# Patient Record
Sex: MALE | Race: ASIAN | NOT HISPANIC OR LATINO | URBAN - METROPOLITAN AREA
[De-identification: names, ages, dates, MRNs, and addresses within clinical notes are randomized per-mention and may not be internally consistent; named-entity substitution may affect disease eponyms.]

---

## 2024-09-23 ENCOUNTER — APPOINTMENT (RX ONLY)
Dept: URBAN - METROPOLITAN AREA CLINIC 159 | Facility: CLINIC | Age: 34
Setting detail: DERMATOLOGY
End: 2024-09-23

## 2024-09-23 DIAGNOSIS — L40.0 PSORIASIS VULGARIS: ICD-10-CM | Status: INADEQUATELY CONTROLLED

## 2024-09-23 PROCEDURE — ? PRESCRIPTION SAMPLES PROVIDED

## 2024-09-23 PROCEDURE — ? DIAGNOSIS COMMENT

## 2024-09-23 PROCEDURE — ? COUNSELING

## 2024-09-23 PROCEDURE — ? ADDITIONAL NOTES

## 2024-09-23 PROCEDURE — 99204 OFFICE O/P NEW MOD 45 MIN: CPT

## 2024-09-23 PROCEDURE — ? PRESCRIPTION

## 2024-09-23 PROCEDURE — ? ORDER TESTS

## 2024-09-23 PROCEDURE — ? SOTYKTU INITIATION

## 2024-09-23 PROCEDURE — ? LAB REPORTS REVIEWED

## 2024-09-23 RX ORDER — TAPINAROF 10 MG/1000MG
CREAM TOPICAL
Qty: 60 | Refills: 10 | Status: ERX | COMMUNITY
Start: 2024-09-23

## 2024-09-23 RX ORDER — DEUCRAVACITINIB 6 MG/1
TABLET, FILM COATED ORAL
Qty: 30 | Refills: 2 | Status: ERX | COMMUNITY
Start: 2024-09-23

## 2024-09-23 RX ADMIN — TAPINAROF: 10 CREAM TOPICAL at 00:00

## 2024-09-23 RX ADMIN — DEUCRAVACITINIB: 6 TABLET, FILM COATED ORAL at 00:00

## 2024-09-23 ASSESSMENT — LOCATION SIMPLE DESCRIPTION DERM
LOCATION SIMPLE: LEFT EAR
LOCATION SIMPLE: LEFT KNEE
LOCATION SIMPLE: RIGHT ELBOW
LOCATION SIMPLE: RIGHT SMALL FINGER
LOCATION SIMPLE: SCROTUM
LOCATION SIMPLE: RIGHT KNEE
LOCATION SIMPLE: LEFT SMALL FINGER
LOCATION SIMPLE: RIGHT INDEX FINGER
LOCATION SIMPLE: RIGHT MIDDLE FINGER
LOCATION SIMPLE: LEFT THUMB
LOCATION SIMPLE: LEFT MIDDLE FINGER
LOCATION SIMPLE: RIGHT THUMB
LOCATION SIMPLE: RIGHT RING FINGER
LOCATION SIMPLE: RIGHT EAR
LOCATION SIMPLE: LEFT INDEX FINGER
LOCATION SIMPLE: LEFT RING FINGER
LOCATION SIMPLE: LEFT ELBOW

## 2024-09-23 ASSESSMENT — LOCATION DETAILED DESCRIPTION DERM
LOCATION DETAILED: RIGHT DISTAL DORSAL MIDDLE FINGER
LOCATION DETAILED: LEFT SCROTUM
LOCATION DETAILED: LEFT DISTAL DORSAL MIDDLE FINGER
LOCATION DETAILED: RIGHT INFERIOR POSTERIOR HELIX
LOCATION DETAILED: RIGHT ELBOW
LOCATION DETAILED: LEFT KNEE
LOCATION DETAILED: RIGHT DISTAL ULNAR THUMB
LOCATION DETAILED: RIGHT DISTAL ULNAR DORSAL SMALL FINGER
LOCATION DETAILED: LEFT DISTAL ULNAR THUMB
LOCATION DETAILED: RIGHT DISTAL DORSAL RING FINGER
LOCATION DETAILED: LEFT DISTAL DORSAL RING FINGER
LOCATION DETAILED: LEFT POSTAURICULAR CREASE
LOCATION DETAILED: RIGHT INDEX DISTAL INTERPHALANGEAL JOINT
LOCATION DETAILED: RIGHT KNEE
LOCATION DETAILED: LEFT DISTAL DORSAL INDEX FINGER
LOCATION DETAILED: LEFT ELBOW
LOCATION DETAILED: PERIUNGUAL SKIN LEFT SMALL FINGER

## 2024-09-23 ASSESSMENT — PGA PSORIASIS: PGA PSORIASIS 2020: SEVERE

## 2024-09-23 ASSESSMENT — LOCATION ZONE DERM
LOCATION ZONE: FINGER
LOCATION ZONE: LEG
LOCATION ZONE: GENITALIA
LOCATION ZONE: ARM
LOCATION ZONE: EAR

## 2024-09-23 ASSESSMENT — ITCH NUMERIC RATING SCALE: HOW SEVERE IS YOUR ITCHING?: 2

## 2024-09-23 ASSESSMENT — BSA PSORIASIS: % BODY COVERED IN PSORIASIS: 5

## 2024-09-23 NOTE — PROCEDURE: SOTYKTU INITIATION
Sotyktu Dosing: 6mg Daily
Add Pregnancy And Lactation Warning To Medication Counseling?: No
Pregnancy And Lactation Warning Text: There is insufficient data to evaluate whether or not Sotyktu is safe to use during pregnancy.   It is not known if Sotyktu passes into breast milk and whether or not it is safe to use when breastfeeding.  
Detail Level: Zone
Diagnosis (Required): Psoriasis
Sotyktu Monitoring Guidelines: LFTs, lipids, hepatitis screening, TB screening and pregnancy tests should be checked prior to initiating Sotyktu therapy.  Labs may also be checked periodically after the initiation period.

## 2024-09-23 NOTE — PROCEDURE: ORDER TESTS
Billing Type: Third-Party Bill
Performing Laboratory: [id number]
Expected Date Of Service: 09/23/2024
Lab Facility: [id number]
Bill For Surgical Tray: no

## 2024-09-23 NOTE — PROCEDURE: DIAGNOSIS COMMENT
Render Risk Assessment In Note?: no
Detail Level: Simple
Comment: Discussed starting systemic medication such as oral medication or injections. Pt denies any illness . [place] sotyktu .

## 2024-09-23 NOTE — PROCEDURE: ADDITIONAL NOTES
Detail Level: Simple
Additional Notes: -----------------------------9/23/24---------------------\\n33 y/o AM in the  with international deployments with a h/o psoriasis (dx 2020) on the ears, hands/fingers, scrotum, knees, elbows. Thick hyperkeratotic plaques involving majority of scrotum. Failed clobetasol, desonide, tacrolimus.    Denies joint pain.  PMH significant for GERD and Asthma.  Denies h/o cancer, serious infections, kidney or liver dysfunction.  Prior yellow and typhoid fever vaccinations with .  Not a candidate for light box or biologic therapy d/t  deployments.
Render Risk Assessment In Note?: no

## 2024-09-23 NOTE — PROCEDURE: LAB REPORTS REVIEWED
Detail Level: Zone
Summarized Lab Results: Reviewed labs from PCP (9/12/24): \\n-CMP WNL\\n-Lipid Panel: TC (157), low HDL (28), high TG (190)\\n-unable to review CBC

## 2024-09-23 NOTE — HPI: PSORIASIS (PATIENT REPORTED)
Do You Have A Family History Of Psoriasis?: no
Where Is Your Psoriasis Located?: Elbows,knees,behind ears,groin,fingers
List Prescription Topical Steroids You Are Using (Separate Each Name With A Comma):: Clobetasol ointment
Additional History: Patient reports he was diagnosed with psoriasis 2020.

## 2024-11-27 ENCOUNTER — APPOINTMENT (RX ONLY)
Dept: URBAN - METROPOLITAN AREA CLINIC 159 | Facility: CLINIC | Age: 34
Setting detail: DERMATOLOGY
End: 2024-11-27

## 2024-11-27 DIAGNOSIS — L40.0 PSORIASIS VULGARIS: ICD-10-CM

## 2024-11-27 PROCEDURE — ? COUNSELING

## 2024-11-27 PROCEDURE — ? SOTYKTU INITIATION

## 2024-11-27 PROCEDURE — ? DIAGNOSIS COMMENT

## 2024-11-27 PROCEDURE — ? PRESCRIPTION

## 2024-11-27 PROCEDURE — ? LAB REPORTS REVIEWED

## 2024-11-27 PROCEDURE — 99214 OFFICE O/P EST MOD 30 MIN: CPT

## 2024-11-27 RX ORDER — DEUCRAVACITINIB 6 MG/1
TABLET, FILM COATED ORAL
Qty: 30 | Refills: 2 | Status: ERX

## 2024-11-27 ASSESSMENT — LOCATION SIMPLE DESCRIPTION DERM
LOCATION SIMPLE: LEFT KNEE
LOCATION SIMPLE: LEFT SMALL FINGER
LOCATION SIMPLE: SCROTUM
LOCATION SIMPLE: LEFT EAR
LOCATION SIMPLE: RIGHT MIDDLE FINGER
LOCATION SIMPLE: RIGHT INDEX FINGER
LOCATION SIMPLE: RIGHT KNEE
LOCATION SIMPLE: LEFT INDEX FINGER
LOCATION SIMPLE: RIGHT EAR
LOCATION SIMPLE: LEFT MIDDLE FINGER
LOCATION SIMPLE: LEFT ELBOW
LOCATION SIMPLE: RIGHT ELBOW
LOCATION SIMPLE: LEFT RING FINGER
LOCATION SIMPLE: RIGHT THUMB
LOCATION SIMPLE: RIGHT SMALL FINGER
LOCATION SIMPLE: RIGHT RING FINGER
LOCATION SIMPLE: LEFT THUMB

## 2024-11-27 ASSESSMENT — LOCATION DETAILED DESCRIPTION DERM
LOCATION DETAILED: LEFT SCROTUM
LOCATION DETAILED: PERIUNGUAL SKIN LEFT SMALL FINGER
LOCATION DETAILED: LEFT DISTAL DORSAL INDEX FINGER
LOCATION DETAILED: RIGHT INFERIOR POSTERIOR HELIX
LOCATION DETAILED: RIGHT ELBOW
LOCATION DETAILED: LEFT KNEE
LOCATION DETAILED: RIGHT DISTAL DORSAL RING FINGER
LOCATION DETAILED: LEFT ELBOW
LOCATION DETAILED: LEFT POSTAURICULAR CREASE
LOCATION DETAILED: RIGHT KNEE
LOCATION DETAILED: RIGHT INDEX DISTAL INTERPHALANGEAL JOINT
LOCATION DETAILED: RIGHT DISTAL DORSAL MIDDLE FINGER
LOCATION DETAILED: RIGHT DISTAL ULNAR THUMB
LOCATION DETAILED: LEFT DISTAL DORSAL RING FINGER
LOCATION DETAILED: LEFT DISTAL ULNAR THUMB
LOCATION DETAILED: LEFT DISTAL DORSAL MIDDLE FINGER
LOCATION DETAILED: RIGHT DISTAL ULNAR DORSAL SMALL FINGER

## 2024-11-27 ASSESSMENT — LOCATION ZONE DERM
LOCATION ZONE: FINGER
LOCATION ZONE: GENITALIA
LOCATION ZONE: LEG
LOCATION ZONE: ARM
LOCATION ZONE: EAR

## 2024-11-27 ASSESSMENT — PGA PSORIASIS: PGA PSORIASIS 2020: SEVERE

## 2024-11-27 ASSESSMENT — ITCH NUMERIC RATING SCALE: HOW SEVERE IS YOUR ITCHING?: 3

## 2024-11-27 ASSESSMENT — BSA PSORIASIS: % BODY COVERED IN PSORIASIS: 6

## 2024-12-03 RX ORDER — DEUCRAVACITINIB 6 MG/1
TABLET, FILM COATED ORAL
Qty: 30 | Refills: 2 | Status: ERX